# Patient Record
Sex: FEMALE | Race: WHITE
[De-identification: names, ages, dates, MRNs, and addresses within clinical notes are randomized per-mention and may not be internally consistent; named-entity substitution may affect disease eponyms.]

---

## 2018-06-04 ENCOUNTER — HOSPITAL ENCOUNTER (OUTPATIENT)
Dept: HOSPITAL 17 - CPRE | Age: 58
End: 2018-06-04
Attending: ORTHOPAEDIC SURGERY
Payer: OTHER GOVERNMENT

## 2018-06-04 DIAGNOSIS — Z01.818: Primary | ICD-10-CM

## 2018-06-20 ENCOUNTER — HOSPITAL ENCOUNTER (INPATIENT)
Dept: HOSPITAL 17 - HSDI | Age: 58
LOS: 1 days | Discharge: HOME HEALTH | DRG: 470 | End: 2018-06-21
Attending: ORTHOPAEDIC SURGERY | Admitting: ORTHOPAEDIC SURGERY
Payer: OTHER GOVERNMENT

## 2018-06-20 VITALS — BODY MASS INDEX: 27.71 KG/M2 | HEIGHT: 66 IN | WEIGHT: 172.4 LBS

## 2018-06-20 VITALS
SYSTOLIC BLOOD PRESSURE: 127 MMHG | OXYGEN SATURATION: 97 % | HEART RATE: 65 BPM | TEMPERATURE: 97.5 F | RESPIRATION RATE: 18 BRPM | DIASTOLIC BLOOD PRESSURE: 76 MMHG

## 2018-06-20 VITALS
RESPIRATION RATE: 18 BRPM | HEART RATE: 91 BPM | TEMPERATURE: 97.3 F | DIASTOLIC BLOOD PRESSURE: 77 MMHG | SYSTOLIC BLOOD PRESSURE: 128 MMHG | OXYGEN SATURATION: 96 %

## 2018-06-20 VITALS
DIASTOLIC BLOOD PRESSURE: 93 MMHG | TEMPERATURE: 97.3 F | HEART RATE: 100 BPM | SYSTOLIC BLOOD PRESSURE: 158 MMHG | RESPIRATION RATE: 16 BRPM | OXYGEN SATURATION: 99 %

## 2018-06-20 VITALS — HEART RATE: 69 BPM

## 2018-06-20 VITALS
RESPIRATION RATE: 16 BRPM | HEART RATE: 76 BPM | DIASTOLIC BLOOD PRESSURE: 86 MMHG | TEMPERATURE: 97.6 F | SYSTOLIC BLOOD PRESSURE: 140 MMHG | OXYGEN SATURATION: 98 %

## 2018-06-20 VITALS — OXYGEN SATURATION: 99 %

## 2018-06-20 DIAGNOSIS — I10: ICD-10-CM

## 2018-06-20 DIAGNOSIS — M17.11: Primary | ICD-10-CM

## 2018-06-20 DIAGNOSIS — M25.761: ICD-10-CM

## 2018-06-20 DIAGNOSIS — E03.9: ICD-10-CM

## 2018-06-20 DIAGNOSIS — J45.909: ICD-10-CM

## 2018-06-20 DIAGNOSIS — Z87.891: ICD-10-CM

## 2018-06-20 DIAGNOSIS — Z96.652: ICD-10-CM

## 2018-06-20 PROCEDURE — L1830 KO IMMOB CANVAS LONG PRE OTS: HCPCS

## 2018-06-20 PROCEDURE — 0SRC0J9 REPLACEMENT OF RIGHT KNEE JOINT WITH SYNTHETIC SUBSTITUTE, CEMENTED, OPEN APPROACH: ICD-10-PCS | Performed by: ORTHOPAEDIC SURGERY

## 2018-06-20 PROCEDURE — C1776 JOINT DEVICE (IMPLANTABLE): HCPCS

## 2018-06-20 PROCEDURE — 86850 RBC ANTIBODY SCREEN: CPT

## 2018-06-20 PROCEDURE — 86901 BLOOD TYPING SEROLOGIC RH(D): CPT

## 2018-06-20 PROCEDURE — 3E0T3BZ INTRODUCTION OF ANESTHETIC AGENT INTO PERIPHERAL NERVES AND PLEXI, PERCUTANEOUS APPROACH: ICD-10-PCS | Performed by: ANESTHESIOLOGY

## 2018-06-20 PROCEDURE — 85027 COMPLETE CBC AUTOMATED: CPT

## 2018-06-20 PROCEDURE — 73560 X-RAY EXAM OF KNEE 1 OR 2: CPT

## 2018-06-20 PROCEDURE — 94150 VITAL CAPACITY TEST: CPT

## 2018-06-20 PROCEDURE — C9290 INJ, BUPIVACAINE LIPOSOME: HCPCS

## 2018-06-20 PROCEDURE — 86900 BLOOD TYPING SEROLOGIC ABO: CPT

## 2018-06-20 RX ADMIN — BUDESONIDE AND FORMOTEROL FUMARATE DIHYDRATE SCH PUFF: 160; 4.5 AEROSOL RESPIRATORY (INHALATION) at 09:00

## 2018-06-20 RX ADMIN — HYDROCODONE BITARTRATE AND ACETAMINOPHEN PRN TAB: 5; 325 TABLET ORAL at 13:21

## 2018-06-20 RX ADMIN — ENALAPRIL MALEATE SCH MG: 10 TABLET ORAL at 09:00

## 2018-06-20 RX ADMIN — HYDROCODONE BITARTRATE AND ACETAMINOPHEN PRN TAB: 5; 325 TABLET ORAL at 17:11

## 2018-06-20 RX ADMIN — BUDESONIDE AND FORMOTEROL FUMARATE DIHYDRATE SCH PUFF: 160; 4.5 AEROSOL RESPIRATORY (INHALATION) at 21:00

## 2018-06-20 RX ADMIN — PHENYTOIN SODIUM SCH MLS/HR: 50 INJECTION INTRAMUSCULAR; INTRAVENOUS at 09:10

## 2018-06-20 RX ADMIN — HYDROCODONE BITARTRATE AND ACETAMINOPHEN PRN TAB: 5; 325 TABLET ORAL at 21:29

## 2018-06-20 RX ADMIN — PHENYTOIN SODIUM SCH MLS/HR: 50 INJECTION INTRAMUSCULAR; INTRAVENOUS at 18:35

## 2018-06-20 NOTE — HHI.FF
Face to Face Verification


Diagnosis:  


(1) Primary localized osteoarthrosis, lower leg


(2) Status post total knee replacement, right


Physical Therapy


Gait training, Transfer training, bed to chair


Knee:  Total knee


Right LE Weight Bearing:  WB as tolerated


Right LE Range of Motion:  Active ROM





Nursing


Nursing:  Lovenox teaching


Dressing Changes:  Do not change dressing


Additional Instructions


First dressing change in the office.











I have seen patient Jessenia Rosado on 6/20/18. My clinical findings 

support the need for the requested home health care services because:








 Limited ability to care for self





 High risk of falls














I certify that my clinical findings support that this patient is homebound 

because:








 Post-op weakness





 Unsteady gait/balance

















Jordan Cole Jun 20, 2018 07:00

## 2018-06-20 NOTE — PD.OP
cc:   Oleksandr Chacko MD


__________________________________________________





Operative Report


Date of Surgery:  Jun 20, 2018


Preoperative Diagnosis:  


Right knee severe arthritis


Postoperative Diagnosis:  


Same


Procedure:


Right total knee arthroplasty


Anesthesia:


Spinal and adductor canal block


Surgeon:


Oleksandr Chacko


Assistant(s):


JOSÉ MIGUEL Ohara


 


The surgical procedure was assisted by my Advanced Registered Nurse 

Practitioner.  My ARNP presence was necessary throughout this case for the 

manipulation and positioning of the surgical extremity. My ARNP was assisting 

me throughout the duration of this procedure.  The skill set of an Advance 

Registered Nurse Practitioner was medically necessary to complete this 

procedure.  During the surgical case, the surgical tech was working at the back 

table and the Advance Registered Nurse Practitioner was directly assisting me.


Operation and Findings:





IMPLANTS:


DePuy Attune:


Patella: size 35.


Femur, posterior stabilized size 6 narrow.


Tibia, rotating platform size 5.


Tibial insert, rotating platform, posterior stabilized size 5 mm thickness.


 


ESTIMATED BLOOD LOSS:


75 cc


 





TOURNIQUET TIME:


39 minutes at 250 mmHg pressure.


 


JUSTIFICATION FOR PROCEDURE:


The patient has end-stage osteoarthritis to the knee. There is an attached 

conservative measures pathway form in the chart that describes the nonoperative 

measures that were undertaken prior to consideration of surgical management. 

The patient understood the risks and benefits of surgical management. See my 

office notes for further details


 


PROCEDURE:


The patient was brought back to the operative theatre. Adequate anesthesia was 

obtained.  The patient received intravenous vancomycin and Ancef. The lower 

extremity was prepped and draped in the usual sterile fashion.The leg was 

exsanguinated, the tourniquet was raised.





A standard anterior incision was performed followed by medial parapatellar 

arthrotomy was performed. End-stage arthritis was identified.  Osteotomy of the 

patella was performed.  We drilled holes for the patella.  We trialed the 

patella component.  We placed an intramedullary guide into the distal femur. We 

ultimately resected 13 mm off of the distal femur in 5 degrees of valgus.





The remnants of the ACL and PCL were resected. Osteotomy of the proximal tibia 

was performed, resecting 5 mm off of the medial side.  This was done with 3 

degrees of posterior slope using an extramedullary guide.  The distal end of 

the guide was placed in the mid aspect of the ankle.





The femur was sized, and four chamfer cuts were completed in 3 of external 

rotation. We then cut the central box in the distal femur to replace the PCL.  

We resected the remnants of the menisci and removed osteophytes off of the 

femur and tibia. We then trialed the knee.





We punched the tibia for the keel, and then used standard technique to cement 

in components. Excess cement was removed. We trialed the knee again and the 

final polyethylene thickness was chosen to provide extension to 0 degrees, and 

flexion of 140 degrees to gravity. The ligaments were appropriately balanced.





Lateral release was necessary to obtain excellent patellofemoral tracking.   

The tourniquet was released and adequate hemostasis was obtained.  An intra-

articular injection of a ropivacaine cocktail was injected.  The posterior knee 

was inspected for excess cement, which was removed.  The final polyethylene was 

put into position after thorough irrigation.  We then closed deep fascia with a 

#2 Stratafix followed by skin with 2-0 Vicryl followed by Dermabond dressing.





Postop plan is to weight-bear as tolerated.  DVT prophylaxis will be performed 

with Helene, HANS hose, early mobilization, and Lovenox followed by aspirin.











Oleksandr Chacko MD Jun 20, 2018 08:40

## 2018-06-20 NOTE — HHI.DCPOC
Discharge Care Plan


Diagnosis:  


(1) Status post total knee replacement, right


(2) Primary localized osteoarthrosis, lower leg








Your Health Problems Are: Difficulty with ADL








Goals to Promote Your Health


* To prevent worsening of your condition and complications


* To maintain your health at the optimal level


Directions to Meet Your Goals


*** Take your medications as prescribed


*** Follow your dietary instruction


*** Follow activity as directed








*** Keep your appointments as scheduled


*** Take your immunizations and boosters as scheduled


*** If your symptoms worsen call your PCP, if no PCP go to Urgent Care Center 

or Emergency Room***


*** Smoking is Dangerous to Your Health. Avoid second hand smoke***


***Call the 24-hour hour crisis hotline for domestic abuse at 1-641.128.2991***











Jordan Cole Jun 20, 2018 06:58

## 2018-06-20 NOTE — PD.CONS
HPI


Service


[facility]


Consult Requested By


Dr. Chacko


Reason for Consult


Opinion and recommendation of treatment patient's hypertension, hypothyroidism


Primary Care Physician


[facility]


Diagnoses:  


History of Present Illness


58-year-old white female with a history of hypertension, hypothyroidism, asthma

, osteoarthritis was had long-term history of right hip pain despite 

conservative treatment.  She electively underwent a right total hip 

arthroplasty with orthopedic surgery Dr. Chacko today.  She states prior to the 

surgery she took  MiraLAX daily and did not have any constipation problems.  In 

the past she has had hemorrhoids that has been resolved.  In addition she had 

no complaints of blood in the stools or black tarry stools.





Review of Systems


Constitutional:  DENIES: Fatigue, Fever, Chills, Change in appetite


Endocrine:  DENIES: Heat/cold intolerance


Eyes:  DENIES: Blurred vision, Eye pain, Vision loss


Ears, nose, mouth, throat:  DENIES: Hearing loss, Nasal discharge, Throat pain, 

Ear Pain, Sinus Pain


Respiratory:  DENIES: Cough, Shortness of breath


Cardiovascular:  DENIES: Chest pain, Palpitations, Dyspnea on Exertion, Lower 

Extremity Edema


Gastrointestinal:  DENIES: Abdominal pain, Black stools, Bloody stools, 

Constipation, Diarrhea, Nausea, Vomiting


Genitourinary:  DENIES: Dysuria


Musculoskeletal:  COMPLAINS OF: Joint pain (Left knee pain), DENIES: Muscle 

aches, Stiffness


Integumentary:  DENIES: Rash


Hematologic/lymphatic:  DENIES: Bruising, Lymphadenopathy


Immunologic/allergic:  DENIES: Eczema


Neurologic:  DENIES: Headache, Localized weakness, Paresthesias


Psychiatric:  DENIES: Anxiety, Depression, Suicidal Ideation





Past Family Social History


Allergies:  


Coded Allergies:  


     meloxicam (Unverified  Allergy, Severe, Swelling, 6/4/18)


 facial swelling


Past Medical History


Hypertension


Hypothyroidism


Osteoarthritis


Asthma


Past Surgical History


Cholecystectomy


Bilateral tubal ligation


Left shoulder arthroscopic


Bilateral knee arthroscopic


Left total knee arthroplasty


Reported Medications


Acetaminophen/diphenhydramine 2 tablets p.o. nightly


Albuterol inhaler 2 puffs every 4 P26 as needed for shortness of breath


Symbicort 2 puffs inhaler every 12 hours


Diclofenac 75 mg p.o. daily


Enalapril 10 mg p.o. daily


Estrogen/medroxyprogesterone 1 p.o. daily


Levothyroxine 112 MCG's p.o. daily


Polyethylene glycol  17 g p.o. daily


Tramadol 50 mg p.o. every 4 hours as needed for pain


Family History


Noncontributory


Social History


Does not smoke cigarettes





Physical Exam


Vital Signs





Vital Signs








  Date Time  Temp Pulse Resp B/P (MAP) Pulse Ox O2 Delivery O2 Flow Rate FiO2


 


6/20/18 09:06 97.5 69 14 125/75 (92) 100 Nasal Cannula 2 


 


6/20/18 06:14     100 Nasal Cannula 2 


 


6/20/18 06:14  69      


 


6/20/18 06:08 98.2 63 18 161/87 (111) 99   








Physical Exam


GENERAL: This is a well-nourished, well-developed patient, in no apparent 

distress.


SKIN: No rashes, ecchymoses or lesions. Cool and dry.


HEAD: Atraumatic. Normocephalic. No temporal or scalp tenderness.


EYES: Pupils equal round and reactive. Extraocular motions intact. No scleral 

icterus. No injection or drainage. 


ENT: Nose without bleeding, purulent drainage or septal hematoma. 


NECK: Trachea midline. No JVD or lymphadenopathy. Supple, nontender, no 

meningeal signs.


CARDIOVASCULAR: Regular rate and rhythm 


RESPIRATORY: Clear to auscultation. Breath sounds equal bilaterally. No wheezes

, rales, or rhonchi.  


GASTROINTESTINAL: Abdomen soft, non-tender, nondistended. No hepato-splenomegaly

, or palpable masses. No guarding.


MUSCULOSKELETAL: Extremities without clubbing, cyanosis, or edema.  Bilateral 

SCDs in place


NEUROLOGICAL: Awake and alert. Cranial nerves II through XII intact.  Motor and 

sensory grossly within normal limits.  Normal speech.





Assessment and Plan


Assessment and Plan


1.  Status post operative right total knee arthroplasty continue postoperative 

care, pain control, physical therapy per orthopedic surgery Dr. Chacko.


2.  Hypertension, chronic essential . resume home.  Enalapril.  Vasotec IV as 

needed for any uncontrolled blood pressure.Further recommendations based on 

blood pressure trends


3.  Hypothyroidism resume home levothyroxine 4.


4.  DVT prophylaxis Lovenox





Thank you for this consultation











Bela Huitron MD Jun 20, 2018 13:08

## 2018-06-20 NOTE — RADRPT
EXAM DATE:  6/20/2018 9:52 AM EDT

AGE/SEX:        58 years / Female



INDICATIONS:  Post-operative, total right knee replacement.



CLINICAL DATA:  This is the patient's initial encounter. Patient reports that signs and symptoms have
 been present for 1 day and indicates a pain score of 0/10. 

                                                                          

MEDICAL/SURGICAL HISTORY:       None. . Prior right knee scope.



COMPARISON:      No prior exams available for comparison. 





FINDINGS:  

The patient is status post placement of total knee prosthesis. The prostatic components are well plac
ed. There is air within the joint space and the soft tissues which is an expected postoperative findi
ng.



CONCLUSION: 

Successful total knee replacement. 



 







Electronically signed by: Coy Farley MD  6/20/2018 9:58 AM EDT

## 2018-06-21 VITALS
HEART RATE: 71 BPM | DIASTOLIC BLOOD PRESSURE: 89 MMHG | RESPIRATION RATE: 18 BRPM | TEMPERATURE: 98.1 F | OXYGEN SATURATION: 97 % | SYSTOLIC BLOOD PRESSURE: 148 MMHG

## 2018-06-21 VITALS
SYSTOLIC BLOOD PRESSURE: 142 MMHG | TEMPERATURE: 97.7 F | HEART RATE: 89 BPM | OXYGEN SATURATION: 100 % | DIASTOLIC BLOOD PRESSURE: 137 MMHG | RESPIRATION RATE: 18 BRPM

## 2018-06-21 VITALS
OXYGEN SATURATION: 99 % | HEART RATE: 71 BPM | SYSTOLIC BLOOD PRESSURE: 132 MMHG | TEMPERATURE: 97.7 F | RESPIRATION RATE: 19 BRPM | DIASTOLIC BLOOD PRESSURE: 78 MMHG

## 2018-06-21 VITALS
RESPIRATION RATE: 18 BRPM | DIASTOLIC BLOOD PRESSURE: 82 MMHG | TEMPERATURE: 98.1 F | OXYGEN SATURATION: 98 % | SYSTOLIC BLOOD PRESSURE: 137 MMHG | HEART RATE: 77 BPM

## 2018-06-21 VITALS — OXYGEN SATURATION: 99 %

## 2018-06-21 LAB
ERYTHROCYTE [DISTWIDTH] IN BLOOD BY AUTOMATED COUNT: 13.6 % (ref 11.6–17.2)
HCT VFR BLD CALC: 28.6 % (ref 35–46)
HGB BLD-MCNC: 9.7 GM/DL (ref 11.6–15.3)
MCH RBC QN AUTO: 28.8 PG (ref 27–34)
MCHC RBC AUTO-ENTMCNC: 33.8 % (ref 32–36)
MCV RBC AUTO: 85.1 FL (ref 80–100)
PLATELET # BLD: 212 TH/MM3 (ref 150–450)
PMV BLD AUTO: 7.5 FL (ref 7–11)
RBC # BLD AUTO: 3.36 MIL/MM3 (ref 4–5.3)
WBC # BLD AUTO: 11.3 TH/MM3 (ref 4–11)

## 2018-06-21 RX ADMIN — HYDROCODONE BITARTRATE AND ACETAMINOPHEN PRN TAB: 5; 325 TABLET ORAL at 08:49

## 2018-06-21 RX ADMIN — HYDROCODONE BITARTRATE AND ACETAMINOPHEN PRN TAB: 5; 325 TABLET ORAL at 01:18

## 2018-06-21 RX ADMIN — PHENYTOIN SODIUM SCH MLS/HR: 50 INJECTION INTRAMUSCULAR; INTRAVENOUS at 14:35

## 2018-06-21 RX ADMIN — POLYETHYLENE GLYCOL 3350 SCH GM: 17 POWDER, FOR SOLUTION ORAL at 00:15

## 2018-06-21 RX ADMIN — HYDROCODONE BITARTRATE AND ACETAMINOPHEN PRN TAB: 5; 325 TABLET ORAL at 16:47

## 2018-06-21 RX ADMIN — BUDESONIDE AND FORMOTEROL FUMARATE DIHYDRATE SCH PUFF: 160; 4.5 AEROSOL RESPIRATORY (INHALATION) at 09:00

## 2018-06-21 RX ADMIN — HYDROCODONE BITARTRATE AND ACETAMINOPHEN PRN TAB: 5; 325 TABLET ORAL at 05:07

## 2018-06-21 RX ADMIN — POLYETHYLENE GLYCOL 3350 SCH GM: 17 POWDER, FOR SOLUTION ORAL at 08:49

## 2018-06-21 RX ADMIN — PHENYTOIN SODIUM SCH MLS/HR: 50 INJECTION INTRAMUSCULAR; INTRAVENOUS at 04:35

## 2018-06-21 RX ADMIN — HYDROCODONE BITARTRATE AND ACETAMINOPHEN PRN TAB: 5; 325 TABLET ORAL at 12:52

## 2018-06-21 RX ADMIN — ENALAPRIL MALEATE SCH MG: 10 TABLET ORAL at 08:49

## 2018-06-21 NOTE — HHI.PR
Subjective


Remarks


Patient states that her pain is controlled well.  Had a good night.  No other 

concerns at this time.





Objective


Vitals





Vital Signs








  Date Time  Temp Pulse Resp B/P (MAP) Pulse Ox O2 Delivery O2 Flow Rate FiO2


 


6/21/18 05:32 98.1 71 18 148/89 (108) 97   


 


6/20/18 23:58 97.5 65 18 127/76 (93) 97   


 


6/20/18 20:05     99   


 


6/20/18 19:31 97.3 91 18 128/77 (94) 96   


 


6/20/18 18:15   18     


 


6/20/18 16:00 97.3 100 16 158/93 (114) 99   


 


6/20/18 12:00 97.6 76 16 140/86 (104) 98   


 


6/20/18 12:00  64 14 151/86 (107) 97 Room Air  


 


6/20/18 11:00 98.1 74 14 144/84 (104) 100 Room Air  


 


6/20/18 10:45  77 14 144/88 (106) 100 Room Air  


 


6/20/18 10:30  72 14 131/77 (95) 99 Room Air  


 


6/20/18 10:15  60 14 140/85 (103) 99 Room Air  


 


6/20/18 10:00  61 14 139/81 (100) 100 Nasal Cannula 2 


 


6/20/18 09:45  59 14 138/84 (102) 100 Nasal Cannula 2 


 


6/20/18 09:30  60 14 139/78 (98) 100 Nasal Cannula 2 


 


6/20/18 09:15  64 14 127/80 (96) 100 Nasal Cannula 2 














I/O      


 


 6/20/18 6/20/18 6/20/18 6/21/18 6/21/18 6/21/18





 07:00 15:00 23:00 07:00 15:00 23:00


 


Intake Total  2000 ml  360 ml  


 


Output Total  775 ml    


 


Balance  1225 ml  360 ml  


 


      


 


Intake Oral    360 ml  


 


Other  2000 ml    


 


Output Urine Total  700 ml    


 


Estimated Blood Loss  75 ml    


 


# Voids    3  


 


# Bowel Movements    0  








Result Diagram:  


6/21/18 0510





Objective Remarks


GENERAL: This is a well-nourished, well-developed patient, in no apparent 

distress.


CARDIOVASCULAR: Regular rate and rhythm 


RESPIRATORY: Clear to auscultation. Breath sounds equal bilaterally. No wheezes

, rales, or rhonchi.  


MUSCULOSKELETAL: Right knee bandage clean dry intact


NEURO:  Alert & Oriented x4 to person, place, time, situation.  Moves all ext x4





A/P


Assessment and Plan


1.  Status post operative day #1 right total knee arthroplasty continue 

postoperative care, pain control, physical therapy per orthopedic surgery Dr. Chacko.


2.  Hypertension, chronic essential .  Overall blood pressure controlled.  

Resume home enalapril.  Vasotec IV as needed for any uncontrolled blood 

pressure.


3.  Hypothyroidism resume home levothyroxine 


4.  DVT prophylaxis Lovenox


Discharge Planning


Home with home health care when cleared by orthopedic surgery











Bela Huitron MD Jun 21, 2018 09:12

## 2018-06-21 NOTE — HHI.DS
Discharge Summary


Admission Date


Jun 20, 2018 at 05:18


Discharge Date:  Jun 21, 2018


Admitting Diagnosis


Primary localized OA, lower leg


Status post total knee replacement, right


Diagnosis:  


(1) Primary localized osteoarthrosis, lower leg


Diagnosis:  Principal


ICD Codes:  M17.10 - Unilateral primary osteoarthritis, unspecified knee


Status:  Acute


(2) Status post total knee replacement, right


Diagnosis:  Principal


ICD Codes:  Z96.651 - Presence of right artificial knee joint


Procedures


Right TKA


Brief History


This is a 58 year old female patient with severe OA of the right knee.


CBC/BMP:  


6/21/18 0510





Significant Findings





Laboratory Tests








Test


  6/21/18


05:10


 


White Blood Count


  11.3 TH/MM3


(4.0-11.0)


 


Red Blood Count


  3.36 MIL/MM3


(4.00-5.30)


 


Hemoglobin


  9.7 GM/DL


(11.6-15.3)


 


Hematocrit


  28.6 %


(35.0-46.0)








PE at Discharge


Dressing is C/D/I.  EHL/TA/G intact.  2+ pedal pulse.  Calf is soft and 

nontender.  + SILT distally.


Hospital Course


The patient was admitted to the hospital for severe OA of the right knee to 

have a right TKA.  The patient's surgery went well without complication.  The 

patient is WBAT.  The patient is on a regular diet.  The patient was placed on 

Lovenox followed by ASA for DVT prophylaxis.  The patient was discharged home 

with home health and will f/u in the office with as previously scheduled with 

Dr. Birch or JOSÉ MIGUEL Bender.


Pt Condition on Discharge:  Stable


Discharge Disposition:  Disch w/ Home Health Serv


Discharge Instructions


Diet Instructions:  As Tolerated, No Restrictions


Activities You Can Perform:  Weight Bearing as Adam


Activities to Avoid:  Strenuous Activity


Follow up Referrals:  


Appointment for Follow Up @ [facility]





New Medications:  


Commode 3-in-1 (Commode 3-in-1) 1 Mis Mis


EA .XX AS DIRECTED, #1 0 Refills





CPM-Continuous Passive Motion Machine (CPM-Continuous Passive Motion Machine) 1 

Ea Device


EA .XX AS DIRECTED, #1 0 Refills





Walker with Front Wheels (Walker with Front Wheels) 1 Mis Mis


EA .XX AS DIRECTED, #1 0 Refills





 


Continued Medications:  


Albuterol 6.7 GM Inh (Proventil Hfa 6.7 GM Inh) 90 Mcg/Act Aer


2 PUFF INH Q4-6H PRN for SHORTNESS OF BREATH, #1 INHALER 0 Refills





Budesonide-Formoterol Inh (Symbicort Inh) 160-4.5 Mcg/Act Aero


2 PUFF INH Q12HR, #1 INHALER 0 Refills





Conjugated Estrogens-Medroxyprogesterone (Prempro Blister Pack) 0.3-1.5 Mg Tab


1 TAB PO DAILY for Estrogen Supplements, #1 PACK 0 Refills





Enalapril (Enalapril) 10 Mg Tab


10 MG PO DAILY, #30 TAB 0 Refills





Levothyroxine (Levothyroxine) 112 Mcg Tab


112 MCG PO DAILY for Thyroid, #30 TAB 0 Refills





Polyethylene Glycol 3350 Powder (Miralax Powder) 17 Gm Powd


17 GM PO DAILY for Constipation, #1 CAN 0 Refills


Mix and dissolve one measuring cap-ful (17 grams) in water or juice.


 


Discontinued Medications:  


Acetaminophen/Diphenhydramine (Acetaminophen Pm Caplet) 500 Mg-25 Mg Tablet


2 TAB PO HS





Diclofenac Sodium DR (Diclofenac Sodium DR) 75 Mg Tabdr


75 MG PO DAILY, #30 TAB 0 Refills





Tramadol (Tramadol) 50 Mg Tab


50 MG PO Q4H PRN for PAIN, TAB 0 Refills

















Jordan Cole Jun 21, 2018 18:15

## 2018-06-21 NOTE — PD.ORT.PN
Subjective


Post Op Day #:  1


Subjective Remarks


Patient resting in bed in NAD.   Patient has been ambulatory and is having mild 

pain to the right knee.  The patient states she is waiting on her ride for 

discharge.





Objective


Vitals





Vital Signs








  Date Time  Temp Pulse Resp B/P (MAP) Pulse Ox O2 Delivery O2 Flow Rate FiO2


 


6/21/18 15:13 98.1 77 18 137/82 (100) 98   


 


6/21/18 12:00 97.7 89 18 142/137 (139) 100   


 


6/21/18 10:00     99   21


 


6/21/18 08:00 97.7 71 19 132/78 (96) 99   


 


6/21/18 05:32 98.1 71 18 148/89 (108) 97   


 


6/20/18 23:58 97.5 65 18 127/76 (93) 97   


 


6/20/18 20:05     99   


 


6/20/18 19:31 97.3 91 18 128/77 (94) 96   


 


6/20/18 18:15   18     














I/O      


 


 6/20/18 6/20/18 6/20/18 6/21/18 6/21/18 6/21/18





 07:00 15:00 23:00 07:00 15:00 23:00


 


Intake Total  2000 ml  360 ml  


 


Output Total  775 ml    


 


Balance  1225 ml  360 ml  


 


      


 


Intake Oral    360 ml  


 


Other  2000 ml    


 


Output Urine Total  700 ml    


 


Estimated Blood Loss  75 ml    


 


# Voids    3  5


 


# Bowel Movements    0  1








Result Diagram:  


6/21/18 0510





Procedures


Right TKA


Objective Remarks


Dressing is C/D/I.  EHL/TA/G intact.  2+ pedal pulse.  Calf is soft and 

nontender.  + SILT distally.





Assessment & Plan


Ortho Post Op Day #:  1


Problem List:  


Assessment and Plan


POD #1:  Right TKA





1.  WBAT RLE


2.  Lovenox followed by ASA for DVT prophylaxis


3.  Ice to the right knee PRN


4.  Stable per ortho for discharge home with home health


5.  F/U in the office as previously scheduled with Dr. Chacko or JOSÉ MIGUEL Bender.











Jordan Cole Jun 21, 2018 18:11